# Patient Record
Sex: MALE | Race: WHITE | NOT HISPANIC OR LATINO | Employment: OTHER | URBAN - METROPOLITAN AREA
[De-identification: names, ages, dates, MRNs, and addresses within clinical notes are randomized per-mention and may not be internally consistent; named-entity substitution may affect disease eponyms.]

---

## 2020-06-03 NOTE — PATIENT DISCUSSION
Spoke with son who speaks [place] regarding pt.'s cataracts , the recommendation of cataract surgery,  possible limitations. and dx of  narrow angles. They wish to think about proceeding or not. Explained that exam was good for 3 months and will need drops sent and instructions explained including consent (). Son will call back . He resides in [place]. [phone].

## 2020-06-03 NOTE — PATIENT DISCUSSION
spoke with pt.'s son by phone and he wants to talk more in length with his Mother about the decision to have cataract surgery.

## 2020-06-03 NOTE — PATIENT DISCUSSION
** 6/8/2020 Son called back today and pt has decided to proceed with cataract surgery. Plan is OD Basic giorgi first, followed by OS Basic giorgi in one month. PO with Dr Mike Gaffney per pt request. Prefers to stay at [facility] for the post operative appt.'s. PO op drops sent in to [facility]

## 2020-06-22 NOTE — PATIENT DISCUSSION
Spoke with son who speaks [place] regarding pt.'s cataracts , the recommendation of cataract surgery,  possible limitations. and dx of  narrow angles. They wish to think about proceeding or not. Explained that exam was good for 3 months and will need drops sent and instructions explained including consent (). Son will call back . He resides in [place]. [phone].

## 2020-06-22 NOTE — PATIENT DISCUSSION
Spoke with son who speaks [place] regarding pt.'s cataracts , the recommendation of cataract surgery,  possible limitations. and dx of  narrow angles. They wish to think about proceeding or not. Explained that exam was good for 3 months and will need drops sent and instructions explained including consent (). Son will call back . He resides in [place]. [phone].

## 2020-06-22 NOTE — PATIENT DISCUSSION
** 6/8/2020 Son called back today and pt has decided to proceed with cataract surgery. Plan is OD Basic giorgi first, followed by OS Basic giorgi in one month. PO with Dr Rigoberto Khan per pt request. Prefers to stay at [facility] for the post operative appt.'s. PO op drops sent in to [facility]. Justo**.

## 2020-06-22 NOTE — PATIENT DISCUSSION
** 6/8/2020 Son called back today and pt has decided to proceed with cataract surgery. Plan is OD Basic giorgi first, followed by OS Basic giorgi in one month. PO with Dr Aleyxs Rojas per pt request. Prefers to stay at [facility] for the post operative appt.'s. PO op drops sent in to [facility]. Justo**.

## 2020-06-23 NOTE — PATIENT DISCUSSION
** 6/8/2020 Son called back today and pt has decided to proceed with cataract surgery. Plan is OD Basic giorgi first, followed by OS Basic giorgi in one month. PO with Dr Bryan Butler per pt request. Prefers to stay at [facility] for the post operative appt.'s. PO op drops sent in to [facility]. Justo**.

## 2020-06-23 NOTE — PATIENT DISCUSSION
Spoke with son who speaks [place] regarding pt.'s cataracts , the recommendation of cataract surgery,  possible limitations. and dx of  narrow angles. They wish to think about proceeding or not. Explained that exam was good for 3 months and will need drops sent and instructions explained including consent (). Son will call back . He resides in [place]. [phone].

## 2020-06-23 NOTE — PATIENT DISCUSSION
6/23/20 has severe K edema today will take 1-2 weeks to clear.  unable to communicate well with patient due to very limited English.

## 2020-07-07 NOTE — PATIENT DISCUSSION
spoke with pt.'s son by phone and he wants to talk more in length with his Mother about the decision to have cataract surgery.  dec for Sx OS made today with KMS and goal is for Basic DEVIN OS.

## 2020-07-07 NOTE — PATIENT DISCUSSION
** 6/8/2020 Son called back today and pt has decided to proceed with cataract surgery. Plan is OD Basic giorgi first, followed by OS Basic giorgi in one month. PO with Dr Erlinda Browning per pt request. Prefers to stay at [facility] for the post operative appt.'s. PO op drops sent in to [facility]. Justo**.

## 2020-07-07 NOTE — PATIENT DISCUSSION
Spoke with son who speaks [place] regarding pt.'s cataracts , the recommendation of cataract surgery,  possible limitations. and dx of  narrow angles. They wish to think about proceeding or not. Explained that exam was good for 3 months and will need drops sent and instructions explained including consent (). Son will call back . He resides in [place]. [phone].

## 2020-07-20 NOTE — PATIENT DISCUSSION
Spoke with son who speaks [place] regarding pt.'s cataracts , the recommendation of cataract surgery,  possible limitations. and dx of  narrow angles. They wish to think about proceeding or not. Explained that exam was good for 3 months and will need drops sent and instructions explained including consent (). Son will call back . He resides in [place]. [phone].

## 2020-07-21 NOTE — PATIENT DISCUSSION
Spoke with son who speaks [place] regarding pt.'s cataracts , the recommendation of cataract surgery,  possible limitations. and dx of  narrow angles. They wish to think about proceeding or not. Explained that exam was good for 3 months and will need drops sent and instructions explained including consent (). Son will call back . He resides in [place]. [phone].

## 2020-09-01 NOTE — PATIENT DISCUSSION
Patient hasn't shown up for any post op visits since having cataract surgery OS. Patient completed all drops from the surgery. Schedule with Dr. Kitty Jackson for Post op exam OU and refraction OU.

## 2020-09-01 NOTE — PATIENT DISCUSSION
Spoke with son who speaks [place] regarding pt.'s cataracts , the recommendation of cataract surgery,  possible limitations. and dx of  narrow angles. They wish to think about proceeding or not. Explained that exam was good for 3 months and will need drops sent and instructions explained including consent (). Son will call back . He resides in [place]. [phone].

## 2020-09-08 NOTE — PATIENT DISCUSSION
Patient hasn't shown up for any post op visits since having cataract surgery OS. Patient completed all drops from the surgery. Schedule with Dr. Cristal Cabrera for Post op exam OU and refraction OU.

## 2021-01-13 ENCOUNTER — NEW PATIENT COMPREHENSIVE (OUTPATIENT)
Dept: URBAN - METROPOLITAN AREA CLINIC 46 | Facility: CLINIC | Age: 56
End: 2021-01-13

## 2021-01-13 DIAGNOSIS — H04.123: ICD-10-CM

## 2021-01-13 DIAGNOSIS — H52.7: ICD-10-CM

## 2021-01-13 DIAGNOSIS — H25.093: ICD-10-CM

## 2021-01-13 PROCEDURE — 92004 COMPRE OPH EXAM NEW PT 1/>: CPT

## 2021-01-13 PROCEDURE — 92015 DETERMINE REFRACTIVE STATE: CPT

## 2021-01-13 ASSESSMENT — VISUAL ACUITY
OD_SC: 20/20-1
OD_SC: J8
OS_SC: 20/20-1
OS_SC: J6

## 2021-01-13 ASSESSMENT — TONOMETRY
OD_IOP_MMHG: 14
OS_IOP_MMHG: 13

## 2021-03-20 NOTE — PATIENT DISCUSSION
** 6/8/2020 Son called back today and pt has decided to proceed with cataract surgery. Plan is OD Basic giorgi first, followed by OS Basic giorgi in one month. PO with Dr Magy Cabrera per pt request. Prefers to stay at [facility] for the post operative appt.'s. PO op drops sent in to [facility]. Justo**.
1 day PO: Patient is doing well post-operatively. The importance of post-op drop compliance was emphasized. Drop schedule reviewed with patient. Patient to call if any visual changes or concerns.
6/23/20 has severe K edema today will take 1-2 weeks to clear.  unable to communicate well with patient due to very limited English.
Block, Trypan Blue.
Discussed condition and exacerbating conditions/situations (e.g., dry/arid environments, overhead fans, air conditioners, side effect of medications).
Discussed lid hygiene, warm compress and eyelid wash.
Discussed the risks/benefits of YAG Laser Capsulotomy.
Discussed the risks/benefits of laser capsulotomy. Observation recommended.
Discussed unknown potential with OD. No view of Posterior Pole.
Monitor.
Post op gtt instructions reviewed with patient.
Re evaluate after Cataract surgery.
Recommended artificial tears to use: 1 drop 4x a day in both eyes.
See # 1.
Spoke with son who speaks [place] regarding pt.'s cataracts , the recommendation of cataract surgery,  possible limitations. and dx of  narrow angles. They wish to think about proceeding or not. Explained that exam was good for 3 months and will need drops sent and instructions explained including consent (). Son will call back . He resides in [place]. [phone].
The patient feels that the cataract is significantly impacting daily activities and has elected cataract surgery. The risks, benefits, and alternatives to surgery were discussed. The patient elects to proceed with surgery.
The types of intraocular lenses were reviewed with the patient along with a discussion of their various strengths and weaknesses.
spoke with pt.'s son by phone and he wants to talk more in length with his Mother about the decision to have cataract surgery.  dec for Sx OS made today with KMS and goal is for Basic DEVIN OS.
normal...

## 2021-03-29 NOTE — PATIENT DISCUSSION
Patient hasn't shown up for any post op visits since having cataract surgery OS. Patient completed all drops from the surgery. Schedule with Dr. Lovely Kyle for Post op exam OU and refraction OU.

## 2021-03-29 NOTE — PROCEDURE NOTE: CLINICAL
PROCEDURE NOTE: Punctal Plugs, Quintess Dissolvable (09220W, [id number]) #1 Right Lower Lid. Prior to treatment, the risks/benefits/alternatives were discussed. The patient wished to proceed with procedure. Temporary collagen plugs were inserted. Patient tolerated procedure well. There were no complications. Post procedure instructions given. 0.5 mm RLL today in office s comps.

## 2021-03-29 NOTE — PATIENT DISCUSSION
3/29/2021:  VERY DRY OD and inflammed as well.  Neil plug RLL today 0.5 mm s comps.  add Optive Gel 3-4x/day and PA BID OD for 2 weeks then QD for 2 weeks.  keep FU with me in May to check progress OD.

## 2021-05-18 NOTE — PATIENT DISCUSSION
Patient hasn't shown up for any post op visits since having cataract surgery OS. Patient completed all drops from the surgery. Schedule with Dr. Whitley Gonzalez for Post op exam OU and refraction OU.

## 2022-05-27 NOTE — PATIENT DISCUSSION
Patient hasn't shown up for any post op visits since having cataract surgery OS. Patient completed all drops from the surgery. Schedule with Dr. Christal Bojorquez for Post op exam OU and refraction OU.

## 2022-05-27 NOTE — PATIENT DISCUSSION
will get further DAVIS for glc in near future.  pt and  ed glc can cause vision loss and even blindness without appropriate attention and care.

## 2022-05-27 NOTE — PATIENT DISCUSSION
5/27/2022:.  cont PA BID for 10 days then QD for 10 days.  add pres free Celluvisc QID OU indefinitely.

## 2022-08-24 NOTE — PATIENT DISCUSSION
Re evaluate after Cataract surgery. Terbinafine Counseling: Patient counseling regarding adverse effects of terbinafine including but not limited to headache, diarrhea, rash, upset stomach, liver function test abnormalities, itching, taste/smell disturbance, nausea, abdominal pain, and flatulence.  There is a rare possibility of liver failure that can occur when taking terbinafine.  The patient understands that a baseline LFT and kidney function test may be required. The patient verbalized understanding of the proper use and possible adverse effects of terbinafine.  All of the patient's questions and concerns were addressed.

## 2023-09-07 NOTE — PATIENT DISCUSSION
Re evaluate after Cataract surgery. Oral Minoxidil Pregnancy And Lactation Text: This medication should only be used when clearly needed if you are pregnant, attempting to become pregnant or breast feeding.

## 2024-02-22 ENCOUNTER — APPOINTMENT (RX ONLY)
Dept: URBAN - METROPOLITAN AREA CLINIC 134 | Facility: CLINIC | Age: 59
Setting detail: DERMATOLOGY
End: 2024-02-22

## 2024-02-22 DIAGNOSIS — L57.8 OTHER SKIN CHANGES DUE TO CHRONIC EXPOSURE TO NONIONIZING RADIATION: ICD-10-CM

## 2024-02-22 DIAGNOSIS — D49.2 NEOPLASM OF UNSPECIFIED BEHAVIOR OF BONE, SOFT TISSUE, AND SKIN: ICD-10-CM

## 2024-02-22 PROCEDURE — ? BIOPSY BY SHAVE METHOD

## 2024-02-22 PROCEDURE — 99213 OFFICE O/P EST LOW 20 MIN: CPT | Mod: 25

## 2024-02-22 PROCEDURE — ? COUNSELING

## 2024-02-22 PROCEDURE — 11102 TANGNTL BX SKIN SINGLE LES: CPT

## 2024-02-22 ASSESSMENT — LOCATION ZONE DERM
LOCATION ZONE: NECK
LOCATION ZONE: FACE

## 2024-02-22 ASSESSMENT — LOCATION DETAILED DESCRIPTION DERM
LOCATION DETAILED: RIGHT SUPERIOR LATERAL NECK
LOCATION DETAILED: LEFT CENTRAL MALAR CHEEK

## 2024-02-22 ASSESSMENT — LOCATION SIMPLE DESCRIPTION DERM
LOCATION SIMPLE: LEFT CHEEK
LOCATION SIMPLE: NECK

## 2025-02-26 ENCOUNTER — APPOINTMENT (OUTPATIENT)
Dept: URBAN - METROPOLITAN AREA CLINIC 134 | Facility: CLINIC | Age: 60
Setting detail: DERMATOLOGY
End: 2025-02-26

## 2025-02-26 DIAGNOSIS — L82.0 INFLAMED SEBORRHEIC KERATOSIS: ICD-10-CM

## 2025-02-26 DIAGNOSIS — L57.8 OTHER SKIN CHANGES DUE TO CHRONIC EXPOSURE TO NONIONIZING RADIATION: ICD-10-CM

## 2025-02-26 PROCEDURE — 17110 DESTRUCTION B9 LES UP TO 14: CPT

## 2025-02-26 PROCEDURE — 99213 OFFICE O/P EST LOW 20 MIN: CPT | Mod: 25

## 2025-02-26 PROCEDURE — ? LIQUID NITROGEN

## 2025-02-26 PROCEDURE — ? COUNSELING

## 2025-02-26 ASSESSMENT — LOCATION ZONE DERM: LOCATION ZONE: SCALP

## 2025-02-26 ASSESSMENT — LOCATION DETAILED DESCRIPTION DERM: LOCATION DETAILED: RIGHT SUPERIOR POSTAURICULAR SKIN

## 2025-02-26 ASSESSMENT — LOCATION SIMPLE DESCRIPTION DERM: LOCATION SIMPLE: SCALP

## 2025-02-26 NOTE — PROCEDURE: LIQUID NITROGEN
Medical Necessity Information: It is in your best interest to select a reason for this procedure from the list below. All of these items fulfill various CMS LCD requirements except the new and changing color options.
Show Spray Paint Technique Variable?: Yes
Add 52 Modifier (Optional): no
Aperture Size (Optional): C
Duration Of Freeze Thaw-Cycle (Seconds): 3
Consent: The patient's consent was obtained including but not limited to risks of crusting, scabbing, blistering, scarring, darker or lighter pigmentary change, recurrence, incomplete removal and infection.
Medical Necessity Clause: This procedure was medically necessary because the lesions that were treated were: getting caught on reading glasses,
Detail Level: Simple
Spray Paint Text: The liquid nitrogen was applied to the skin utilizing a spray paint frosting technique.
Post-Care Instructions: I reviewed with the patient in detail post-care instructions. Patient is to wear sunprotection, and avoid picking at any of the treated lesions. Pt may apply Rubbing alcohol to treated areas two times per day for two days.
Application Tool (Optional): Liquid Nitrogen Sprayer
Number Of Freeze-Thaw Cycles: 2 freeze-thaw cycles